# Patient Record
Sex: FEMALE | Race: WHITE | NOT HISPANIC OR LATINO | URBAN - METROPOLITAN AREA
[De-identification: names, ages, dates, MRNs, and addresses within clinical notes are randomized per-mention and may not be internally consistent; named-entity substitution may affect disease eponyms.]

---

## 2017-08-03 ENCOUNTER — IMPORTED ENCOUNTER (OUTPATIENT)
Dept: URBAN - METROPOLITAN AREA CLINIC 43 | Facility: CLINIC | Age: 42
End: 2017-08-03

## 2017-08-03 PROBLEM — H52.13: Noted: 2017-08-03

## 2017-08-03 PROBLEM — G51.4: Noted: 2017-08-03

## 2018-10-11 ENCOUNTER — IMPORTED ENCOUNTER (OUTPATIENT)
Dept: URBAN - METROPOLITAN AREA CLINIC 43 | Facility: CLINIC | Age: 43
End: 2018-10-11

## 2018-10-11 PROBLEM — S09.93XA: Noted: 2018-10-11

## 2018-10-11 PROBLEM — H16.9: Noted: 2018-10-11

## 2018-10-22 ENCOUNTER — IMPORTED ENCOUNTER (OUTPATIENT)
Dept: URBAN - METROPOLITAN AREA CLINIC 43 | Facility: CLINIC | Age: 43
End: 2018-10-22

## 2018-10-22 PROBLEM — H16.9: Noted: 2018-10-22

## 2019-10-17 ENCOUNTER — APPOINTMENT (RX ONLY)
Dept: URBAN - METROPOLITAN AREA CLINIC 124 | Facility: CLINIC | Age: 44
Setting detail: DERMATOLOGY
End: 2019-10-17

## 2019-10-17 DIAGNOSIS — L82.1 OTHER SEBORRHEIC KERATOSIS: ICD-10-CM

## 2019-10-17 DIAGNOSIS — D485 NEOPLASM OF UNCERTAIN BEHAVIOR OF SKIN: ICD-10-CM

## 2019-10-17 DIAGNOSIS — B07.8 OTHER VIRAL WARTS: ICD-10-CM

## 2019-10-17 PROBLEM — J45.909 UNSPECIFIED ASTHMA, UNCOMPLICATED: Status: ACTIVE | Noted: 2019-10-17

## 2019-10-17 PROBLEM — E03.9 HYPOTHYROIDISM, UNSPECIFIED: Status: ACTIVE | Noted: 2019-10-17

## 2019-10-17 PROBLEM — E78.5 HYPERLIPIDEMIA, UNSPECIFIED: Status: ACTIVE | Noted: 2019-10-17

## 2019-10-17 PROBLEM — D23.71 OTHER BENIGN NEOPLASM OF SKIN OF RIGHT LOWER LIMB, INCLUDING HIP: Status: ACTIVE | Noted: 2019-10-17

## 2019-10-17 PROBLEM — F32.9 MAJOR DEPRESSIVE DISORDER, SINGLE EPISODE, UNSPECIFIED: Status: ACTIVE | Noted: 2019-10-17

## 2019-10-17 PROBLEM — Z85.3 PERSONAL HISTORY OF MALIGNANT NEOPLASM OF BREAST: Status: ACTIVE | Noted: 2019-10-17

## 2019-10-17 PROBLEM — D48.5 NEOPLASM OF UNCERTAIN BEHAVIOR OF SKIN: Status: ACTIVE | Noted: 2019-10-17

## 2019-10-17 PROBLEM — F41.9 ANXIETY DISORDER, UNSPECIFIED: Status: ACTIVE | Noted: 2019-10-17

## 2019-10-17 PROCEDURE — ? BIOPSY BY PUNCH METHOD

## 2019-10-17 PROCEDURE — 99202 OFFICE O/P NEW SF 15 MIN: CPT | Mod: 25

## 2019-10-17 PROCEDURE — ? COUNSELING

## 2019-10-17 PROCEDURE — 11104 PUNCH BX SKIN SINGLE LESION: CPT

## 2019-10-17 ASSESSMENT — LOCATION ZONE DERM
LOCATION ZONE: FINGER
LOCATION ZONE: SCALP
LOCATION ZONE: FACE

## 2019-10-17 ASSESSMENT — LOCATION DETAILED DESCRIPTION DERM
LOCATION DETAILED: LEFT PROXIMAL DORSAL MIDDLE FINGER
LOCATION DETAILED: RIGHT LATERAL EYEBROW
LOCATION DETAILED: RIGHT CENTRAL PARIETAL SCALP

## 2019-10-17 ASSESSMENT — LOCATION SIMPLE DESCRIPTION DERM
LOCATION SIMPLE: LEFT MIDDLE FINGER
LOCATION SIMPLE: RIGHT EYEBROW
LOCATION SIMPLE: SCALP

## 2019-10-17 NOTE — PROCEDURE: BIOPSY BY PUNCH METHOD
Wound Care: Vaseline
Epidermal Sutures: none, closed by secondary intention
Detail Level: Detailed
X Size Of Lesion In Cm (Optional): 0
Consent: The provider's intent is to obtain a tissue sample solely for diagnostic purposes. Written consent to obtain tissue sample was obtained and risks were reviewed including but not limited to scarring, infection, bleeding, scabbing, incomplete removal, nerve damage and allergy to anesthesia.
Punch Size In Mm: 4
Render Path Notes In Note?: No
Render Post-Care Instructions In Note?: yes
Hemostasis: None
Billing Type: [employer]
Lab Facility: [address]
Lab: [facility]
Anesthesia Type: 1% lidocaine with epinephrine
Dressing: pressure dressing
Body Location Override (Optional - Billing Will Still Be Based On Selected Body Map Location If Applicable): right mid scalp
Anesthesia Volume In Cc (Will Not Render If 0): 0.5
Biopsy Type: H and E

## 2019-10-30 ENCOUNTER — APPOINTMENT (RX ONLY)
Dept: URBAN - METROPOLITAN AREA CLINIC 124 | Facility: CLINIC | Age: 44
Setting detail: DERMATOLOGY
End: 2019-10-30

## 2019-10-30 DIAGNOSIS — Z48.02 ENCOUNTER FOR REMOVAL OF SUTURES: ICD-10-CM

## 2019-10-30 PROCEDURE — ? SUTURE REMOVAL (GLOBAL PERIOD)

## 2019-10-30 PROCEDURE — 99024 POSTOP FOLLOW-UP VISIT: CPT

## 2019-10-30 ASSESSMENT — LOCATION SIMPLE DESCRIPTION DERM: LOCATION SIMPLE: SCALP

## 2019-10-30 ASSESSMENT — LOCATION DETAILED DESCRIPTION DERM: LOCATION DETAILED: RIGHT SUPERIOR PARIETAL SCALP

## 2019-10-30 ASSESSMENT — LOCATION ZONE DERM: LOCATION ZONE: SCALP

## 2020-04-19 ASSESSMENT — VISUAL ACUITY
OS_SC: 20/100
OD_PH: 20/25
OS_CC: 20/30-1
OD_SC: 20/100-1
OD_SC: 20/40
OD_SC: 20/50-1
OS_CC: 20/20
OS_PH: 20/70
OS_CC: 20/25
OS_SC: 20/30-2
OS_SC: 20/50
OD_CC: 20/25
OD_CC: 20/20

## 2020-04-19 ASSESSMENT — KERATOMETRY
OD_AXISANGLE2_DEGREES: 165
OD_AXISANGLE_DEGREES: 75
OS_K1POWER_DIOPTERS: 45.25
OD_AXISANGLE2_DEGREES: 65
OS_AXISANGLE_DEGREES: 165
OD_K1POWER_DIOPTERS: 45
OD_K2POWER_DIOPTERS: 44.5
OS_K1POWER_DIOPTERS: 44.75
OS_K2POWER_DIOPTERS: 44.75
OD_AXISANGLE_DEGREES: 155
OS_AXISANGLE2_DEGREES: 106
OS_AXISANGLE_DEGREES: 16
OD_K1POWER_DIOPTERS: 45
OS_AXISANGLE2_DEGREES: 165
OD_K2POWER_DIOPTERS: 45.5
OS_K2POWER_DIOPTERS: 44.25

## 2020-04-19 ASSESSMENT — TONOMETRY
OS_IOP_MMHG: 21.0
OD_IOP_MMHG: 16.0
OS_IOP_MMHG: 17.0
OD_IOP_MMHG: 15.0
OS_IOP_MMHG: 16.0

## 2022-06-04 ENCOUNTER — TELEPHONE ENCOUNTER (OUTPATIENT)
Dept: URBAN - METROPOLITAN AREA CLINIC 68 | Facility: CLINIC | Age: 47
End: 2022-06-04

## 2022-06-04 RX ORDER — LEVOTHYROXINE SODIUM 175 UG/1
SYNTHROID( 175MCG ORAL  DAILY ) INACTIVE -HX ENTRY TABLET ORAL DAILY
OUTPATIENT
Start: 2017-07-28

## 2022-06-04 RX ORDER — POLYETHYLENE GLYCOL 3350, SODIUM SULFATE, SODIUM CHLORIDE, POTASSIUM CHLORIDE, ASCORBIC ACID, SODIUM ASCORBATE 7.5-2.691G
KIT ORAL
Qty: 1 | Refills: 0 | OUTPATIENT
Start: 2017-07-28 | End: 2017-07-29

## 2022-06-05 ENCOUNTER — TELEPHONE ENCOUNTER (OUTPATIENT)
Dept: URBAN - METROPOLITAN AREA CLINIC 68 | Facility: CLINIC | Age: 47
End: 2022-06-05

## 2022-06-05 RX ORDER — LEVALBUTEROL 1.25 MG/.5ML
LEVALBUTEROL HCL( 1.25MG/0.5ML INHALATION  AS DIRECTED ) ACTIVE -HX ENTRY SOLUTION, CONCENTRATE RESPIRATORY (INHALATION) AS DIRECTED
Status: ACTIVE | COMMUNITY
Start: 2017-07-28

## 2022-06-05 RX ORDER — ALPRAZOLAM 1 MG
XANAX( 1MG ORAL  AS NEEDED ) ACTIVE -HX ENTRY TABLET ORAL AS NEEDED
Status: ACTIVE | COMMUNITY
Start: 2017-07-28

## 2022-06-05 RX ORDER — ANASTROZOLE 1 MG/1
ANASTROZOLE( 1MG ORAL 1 DAILY ) ACTIVE -HX ENTRY TABLET ORAL DAILY
Status: ACTIVE | COMMUNITY
Start: 2017-07-28

## 2022-06-05 RX ORDER — MOMETASONE FUROATE AND FORMOTEROL FUMARATE DIHYDRATE 200; 5 UG/1; UG/1
DULERA( 200-5MCG/ACT INHALATION  TWICE A DAY ) ACTIVE -HX ENTRY AEROSOL RESPIRATORY (INHALATION) TWICE A DAY
Status: ACTIVE | COMMUNITY
Start: 2017-07-28

## 2022-06-05 RX ORDER — LEVOTHYROXINE SODIUM 150 UG/1
TIROSINT( 150MCG ORAL  AS DIRECTED ) ACTIVE -HX ENTRY CAPSULE ORAL AS DIRECTED
Status: ACTIVE | COMMUNITY
Start: 2017-07-28

## 2022-06-05 RX ORDER — ALBUTEROL SULFATE 90 UG/1
PROAIR HFA( 108 (90 BASE)MCG/ACT INHALATION  AS DIRECTED ) ACTIVE -HX ENTRY AEROSOL, METERED RESPIRATORY (INHALATION) AS DIRECTED
Status: ACTIVE | COMMUNITY
Start: 2017-07-28

## 2022-06-25 ENCOUNTER — TELEPHONE ENCOUNTER (OUTPATIENT)
Age: 47
End: 2022-06-25

## 2022-06-25 RX ORDER — POLYETHYLENE GLYCOL 3350, SODIUM SULFATE, SODIUM CHLORIDE, POTASSIUM CHLORIDE, ASCORBIC ACID, SODIUM ASCORBATE 7.5-2.691G
KIT ORAL
Qty: 1 | Refills: 0 | OUTPATIENT
Start: 2017-07-28 | End: 2017-07-29

## 2022-06-25 RX ORDER — SODIUM SULFATE, POTASSIUM SULFATE, MAGNESIUM SULFATE 17.5; 3.13; 1.6 G/ML; G/ML; G/ML
SOLUTION, CONCENTRATE ORAL AS DIRECTED
Qty: 1 | Refills: 0 | OUTPATIENT
Start: 2012-06-19 | End: 2012-06-20

## 2022-06-25 RX ORDER — LEVOTHYROXINE SODIUM 175 MCG
SYNTHROID( 175MCG ORAL  DAILY ) INACTIVE -HX ENTRY TABLET ORAL DAILY
OUTPATIENT
Start: 2017-07-28

## 2022-06-26 ENCOUNTER — TELEPHONE ENCOUNTER (OUTPATIENT)
Age: 47
End: 2022-06-26

## 2022-06-26 RX ORDER — LEVALBUTEROL 1.25 MG/.5ML
LEVALBUTEROL HCL( 1.25MG/0.5ML INHALATION  AS DIRECTED ) ACTIVE -HX ENTRY SOLUTION, CONCENTRATE RESPIRATORY (INHALATION) AS DIRECTED
Status: ACTIVE | COMMUNITY
Start: 2017-07-28

## 2022-06-26 RX ORDER — ASCORBIC ACID 500 MG
VITAMIN C 500MG(   1 DAILY ) ACTIVE -HX ENTRY CAPSULE, EXTENDED RELEASE ORAL DAILY
Status: ACTIVE | COMMUNITY
Start: 2017-07-28

## 2022-06-26 RX ORDER — ALPRAZOLAM 1 MG
XANAX( 1MG ORAL  AS NEEDED ) ACTIVE -HX ENTRY TABLET ORAL AS NEEDED
Status: ACTIVE | COMMUNITY
Start: 2017-07-28

## 2022-06-26 RX ORDER — LEVOTHYROXINE SODIUM 150 UG/1
TIROSINT( 150MCG ORAL  AS DIRECTED ) ACTIVE -HX ENTRY CAPSULE ORAL AS DIRECTED
Status: ACTIVE | COMMUNITY
Start: 2017-07-28

## 2022-06-26 RX ORDER — ANASTROZOLE 1 MG/1
ANASTROZOLE( 1MG ORAL 1 DAILY ) ACTIVE -HX ENTRY TABLET ORAL DAILY
Status: ACTIVE | COMMUNITY
Start: 2017-07-28

## 2022-06-26 RX ORDER — BECLOMETHASONE DIPROPIONATE HFA 80 UG/1
QVAR( 80MCG/ACT INHALATION 2 TWICE A DAY ) ACTIVE -HX ENTRY AEROSOL, METERED RESPIRATORY (INHALATION) TWICE A DAY
Status: ACTIVE | COMMUNITY
Start: 2017-07-28

## 2022-06-26 RX ORDER — MOMETASONE FUROATE AND FORMOTEROL FUMARATE DIHYDRATE 200; 5 UG/1; UG/1
DULERA( 200-5MCG/ACT INHALATION  TWICE A DAY ) ACTIVE -HX ENTRY AEROSOL RESPIRATORY (INHALATION) TWICE A DAY
Status: ACTIVE | COMMUNITY
Start: 2017-07-28

## 2022-07-09 ENCOUNTER — TELEPHONE ENCOUNTER (OUTPATIENT)
Dept: URBAN - METROPOLITAN AREA CLINIC 121 | Facility: CLINIC | Age: 47
End: 2022-07-09

## 2022-07-10 ENCOUNTER — TELEPHONE ENCOUNTER (OUTPATIENT)
Dept: URBAN - METROPOLITAN AREA CLINIC 121 | Facility: CLINIC | Age: 47
End: 2022-07-10

## 2022-07-30 ENCOUNTER — TELEPHONE ENCOUNTER (OUTPATIENT)
Age: 47
End: 2022-07-30

## 2022-07-31 ENCOUNTER — TELEPHONE ENCOUNTER (OUTPATIENT)
Age: 47
End: 2022-07-31

## 2022-08-28 ENCOUNTER — OFFICE VISIT (OUTPATIENT)
Dept: URBAN - METROPOLITAN AREA CLINIC 68 | Facility: CLINIC | Age: 47
End: 2022-08-28